# Patient Record
Sex: MALE | Race: WHITE | Employment: OTHER | URBAN - METROPOLITAN AREA
[De-identification: names, ages, dates, MRNs, and addresses within clinical notes are randomized per-mention and may not be internally consistent; named-entity substitution may affect disease eponyms.]

---

## 2017-07-20 PROBLEM — I10 ESSENTIAL HYPERTENSION: Status: ACTIVE | Noted: 2017-07-20

## 2020-02-28 PROBLEM — I73.9 CLAUDICATION (HCC): Status: ACTIVE | Noted: 2020-02-28

## 2020-02-28 PROBLEM — Z78.9 STATIN INTOLERANCE: Status: ACTIVE | Noted: 2020-02-28

## 2020-02-28 PROBLEM — I73.9 CLAUDICATION: Status: ACTIVE | Noted: 2020-02-28

## 2020-04-03 PROBLEM — E78.2 MIXED HYPERLIPIDEMIA: Status: ACTIVE | Noted: 2020-04-03

## 2020-12-17 PROBLEM — E78.2 MIXED HYPERLIPIDEMIA: Status: RESOLVED | Noted: 2020-04-03 | Resolved: 2020-12-17

## 2020-12-17 PROBLEM — I48.3 TYPICAL ATRIAL FLUTTER (HCC): Status: ACTIVE | Noted: 2020-12-17

## 2023-11-13 RX ORDER — FEBUXOSTAT 40 MG/1
40 TABLET, FILM COATED ORAL DAILY
Qty: 30 TABLET | Refills: 0 | Status: SHIPPED | OUTPATIENT
Start: 2023-11-13

## 2023-11-13 NOTE — TELEPHONE ENCOUNTER
LOV: 03/18/2021  No future appointments  LABS: Collected 1/12/2022  6:36 PM       Status: Final result    0 Result Notes      Component  Ref Range & Units 1/12/22  6:36 PM   Uric Acid  4.4 - 7.6 MG/DL 3.6 Low    Resulting Agency [facility]

## 2023-11-27 RX ORDER — FEBUXOSTAT 40 MG/1
40 TABLET, FILM COATED ORAL DAILY
Qty: 30 TABLET | Refills: 0 | OUTPATIENT
Start: 2023-11-27

## 2024-01-02 RX ORDER — FEBUXOSTAT 40 MG/1
40 TABLET, FILM COATED ORAL DAILY
Qty: 30 TABLET | Refills: 0 | OUTPATIENT
Start: 2024-01-02

## 2024-01-02 RX ORDER — PREDNISONE 1 MG/1
2 TABLET ORAL DAILY
Qty: 60 TABLET | Refills: 5 | OUTPATIENT
Start: 2024-01-02

## 2024-02-07 ENCOUNTER — OFFICE VISIT (OUTPATIENT)
Dept: RHEUMATOLOGY | Facility: CLINIC | Age: 64
End: 2024-02-07
Payer: COMMERCIAL

## 2024-02-07 VITALS
HEIGHT: 71 IN | WEIGHT: 213 LBS | BODY MASS INDEX: 29.82 KG/M2 | RESPIRATION RATE: 16 BRPM | TEMPERATURE: 98 F | OXYGEN SATURATION: 98 % | DIASTOLIC BLOOD PRESSURE: 70 MMHG | HEART RATE: 84 BPM | SYSTOLIC BLOOD PRESSURE: 120 MMHG

## 2024-02-07 DIAGNOSIS — M10.039 IDIOPATHIC GOUT OF WRIST, UNSPECIFIED CHRONICITY, UNSPECIFIED LATERALITY: ICD-10-CM

## 2024-02-07 DIAGNOSIS — M17.11 PRIMARY OSTEOARTHRITIS OF RIGHT KNEE: Primary | ICD-10-CM

## 2024-02-07 DIAGNOSIS — Z79.52 ON PREDNISONE THERAPY: ICD-10-CM

## 2024-02-07 DIAGNOSIS — M85.80 OSTEOPENIA, UNSPECIFIED LOCATION: ICD-10-CM

## 2024-02-07 PROCEDURE — 3074F SYST BP LT 130 MM HG: CPT | Performed by: INTERNAL MEDICINE

## 2024-02-07 PROCEDURE — 99215 OFFICE O/P EST HI 40 MIN: CPT | Performed by: INTERNAL MEDICINE

## 2024-02-07 PROCEDURE — 3078F DIAST BP <80 MM HG: CPT | Performed by: INTERNAL MEDICINE

## 2024-02-07 PROCEDURE — 3008F BODY MASS INDEX DOCD: CPT | Performed by: INTERNAL MEDICINE

## 2024-02-07 RX ORDER — FEBUXOSTAT 40 MG/1
40 TABLET, FILM COATED ORAL DAILY
Qty: 30 TABLET | Refills: 5 | Status: SHIPPED | OUTPATIENT
Start: 2024-02-07 | End: 2024-02-07

## 2024-02-07 RX ORDER — TRAZODONE HYDROCHLORIDE 50 MG/1
50 TABLET ORAL NIGHTLY
COMMUNITY
Start: 2022-07-01

## 2024-02-07 RX ORDER — PREDNISONE 1 MG/1
1 TABLET ORAL 2 TIMES DAILY WITH MEALS
Qty: 180 TABLET | Refills: 1 | Status: SHIPPED | OUTPATIENT
Start: 2024-02-07

## 2024-02-07 RX ORDER — FEBUXOSTAT 40 MG/1
40 TABLET, FILM COATED ORAL DAILY
Qty: 30 TABLET | Refills: 0 | Status: SHIPPED | OUTPATIENT
Start: 2024-02-07 | End: 2024-02-07

## 2024-02-07 RX ORDER — PREDNISONE 1 MG/1
1 TABLET ORAL 2 TIMES DAILY WITH MEALS
Qty: 60 TABLET | Refills: 1 | Status: SHIPPED | OUTPATIENT
Start: 2024-02-07 | End: 2024-02-07

## 2024-02-07 RX ORDER — PREDNISONE 1 MG/1
1 TABLET ORAL 2 TIMES DAILY WITH MEALS
Qty: 60 TABLET | Refills: 5 | Status: SHIPPED | OUTPATIENT
Start: 2024-02-07 | End: 2024-02-07

## 2024-02-07 RX ORDER — FEBUXOSTAT 40 MG/1
40 TABLET, FILM COATED ORAL DAILY
Qty: 90 TABLET | Refills: 1 | Status: SHIPPED | OUTPATIENT
Start: 2024-02-07

## 2024-02-07 NOTE — PATIENT INSTRUCTIONS
OSTEOARTHRITIS    Fast Facts    Though some of the joint changes are irreversible, most patients will not need joint replacement surgery.    OA symptoms (what you feel) can vary greatly among patients.    A rheumatologist can detect arthritis and prescribe the proper treatment. The goal of treatment in OA is to reduce pain and improve function.    Exercise is an important part of OA treatment, because it can decrease joint pain and improve function.    At present, there is no treatment that can reverse the damage of OA in the joints. Researchers are trying to find ways to slow or reverse this joint damage.    Osteoarthritis (also known as OA) is a common joint disease that most often affects middle-age to elderly people. It is commonly referred to as \"wear and tear\" of the joints, but we now know that OA is a disease of the entire joint, involving the cartilage, joint lining, ligaments, and bone. Although it is more common in older people, it is not really accurate to say that the joints are just \"wearing out.\" It is characterized by breakdown of the cartilage (the tissue that cushions the ends of the bones between joints), bony changes of the joints, deterioration of tendons and ligaments, and various degrees of inflammation of the joint lining (called the synovium).    This arthritis tends to occur in the hand joints, spine, hips, knees, and great toes. The lifetime risk of developing OA of the knee is about 46%, and the lifetime risk of developing OA of the hip is 25%, according to the [facility] Osteoarthritis Project, a long-term study from the [facility] and sponsored by the Centers for Disease Control and Prevention (often called the CDC) and the National Institutes of Health.    OA is a top cause of disability in older people. The goal of osteoarthritis treatment is to reduce pain and improve function. There is no cure for the disease, but some treatments attempt to slow disease  progression.         What is osteoarthritis?    OA is a frequently slowly progressive joint disease typically seen in middle-aged to elderly people. In osteoarthritis, the cartilage between the bones in the joint breaks down. This causes the affected bones to slowly get bigger. The joint cartilage often breaks down because of mechanical stress or biochemical changes within the body, causing the bone underneath to fail. OA can occur together with other types of arthritis, such as gout or rheumatoid arthritis.    OA tends to affect commonly used joints such as the hands and spine, and the weight-bearing joints such as the hips and knees. Symptoms include:    Joint pain and stiffness    Knobby swelling at the joint    Cracking or grinding noise with joint movement    Decreased function of the joint    How do you treat osteoarthritis?    There is no proven treatment yet that can reverse joint damage from OA. The goal of osteoarthritis treatment is to reduce pain and improve function of the affected joints. Most often, this is possible with a mixture of physical measures and drug therapy and, sometimes, surgery.    Physical measures: Weight loss and exercise are useful in OA. Excess weight puts stress on your knee joints and hips and low back. For every 10 pounds of weight you lose over 10 years, you can reduce the chance of developing knee OA by up to 50 percent. Exercise can improve your muscle strength, decrease joint pain and stiffness, and lower the chance of disability due to OA. Also helpful are support (\"assistive\") devices, such as orthotics or a walking cane, that help you do daily activities. Heat or cold therapy can help relieve OA symptoms for a short time.    Certain alternative treatments such as spa (hot tub), massage, and chiropractic manipulation can help relieve pain for a short time. They can be costly, though, and require repeated treatments. Also, the long-term benefits of these alternative  (sometimes called complementary or integrative) medicine treatments are unproven but are under study.    Drug therapy: Forms of drug therapy include topical, oral (by mouth) and injections (shots). You apply topical drugs directly on the skin over the affected joints. These medicines include capsaicin cream, lidocaine and diclofenac gel. Oral pain relievers such as acetaminophen are common first treatments. So are nonsteroidal anti-inflammatory drugs (often called NSAIDs), which decrease swelling and pain.    In 2010, the government (FDA) approved the use of duloxetine (Cymbalta) for chronic (long-term) musculoskeletal pain including from OA. This oral drug is not new. It also is in use for other health concerns, such as mood disorders, nerve pain and fibromyalgia.    Patients with more serious pain may need stronger medications, such as prescription narcotics.    Joint injections with corticosteroids (sometimes called cortisone shots) or with a form of lubricant called hyaluronic acid can give months of pain relief from OA. This lubricant is given in the knee, and these shots may help delay the need for a knee replacement by a few years in some patients.    Surgery: Surgical treatment becomes an option for severe cases. This includes when the joint has serious damage, or when medical treatment fails to relieve pain and you have major loss of function. Surgery may involve arthroscopy, repair of the joint done through small incisions (cuts). If the joint damage cannot be repaired, you may need a joint replacement.    Supplements: Many over-the-counter nutrition supplements have been used for osteoarthritis treatment. Most lack good research data to support their effectiveness and safety. Among the most widely used are calcium, vitamin D and omega-3 fatty acids. To ensure safety and avoid drug interactions, consult your doctor or pharmacist before using any of these supplements. This is especially true when you are  combining these supplements with prescribed

## 2024-02-07 NOTE — PROGRESS NOTES
[facility], [address] [place] [address]      Consult     Connor Stern Patient Status:  No patient class for patient encounter    1960 MRN [id number]   Location [facility], [address] [place] [address] Attending No att. 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    He was last seen in clinic May 2023    We had restarted treatment for gout with lower doses of uloric 40 mg daily with last uric acid levels being normal at 5.8    He declined weaning prednisone and stayed on 2 milligrams daily despite risk as helps him significantly with his arthritic pain stiffness and understands long-term risk    He is open to updating a DEXA scan and updating labs    States he has not been drinking alcohol he quit his job at the [employer] since they do not have a part-time option    States no major issues with his joints.    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 He had subsequent surgery and resection and treatment with antibiotics and was completely healed And also complications with atrial flutter    He had rotator cuff tendinitis and subsequent injection with improvement overall in his pain and no further issues except for occasional achiness and stiffness that his range of motion of the shoulders completely back to normal    He has not been here in over a year.    He has not had any flareups since last visit. He states he had extensive workup from vascular surgeon with no acute findings. He uses CELI hose stockings for his lower extremity edema and likely venous insufficiency that has been chronic.    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    Denies any fevers or chills.    His knee pain has been stable. His last Synvisc injection was a year ago by orthopedic surgery.    States he is not in significant pain and he is holding off another set of injections for now.    His bone density was updated in January 2020 showing osteopenia T score -1.2 of the hip and normal lumbar spine.    He is trying to take calcium and vitamin D. His fracture risk is moderate. He is not interested in bisphosphonate prophylaxis.    States no flareups of his gout. His labs were done in January 2021.    He is a knee replacement candidate but is putting it off. But now the knee pain has worsened and the hyaluronic injections and aren't as effective. He is considering knee replacement in the future. He did get arthroscopic surgery instead with some improvement    He had allergic reaction per patient allopurinol the past.    He's noticed some lower extremity edema and he does have known varicose veins.    He has a history of atrial fibrillation but he has not seen his cardiologist that we recommended to go back there because of his lower extremity edema.    States no shortness of breath or chest pain.    he admits to some weight gain since his job is now sedentary and he stands for 8 hours instead of moving around.    He admits to not exercising regularly.    He was noted to have significantly abnormal LFTs and history of fatty liver in 2013 from an ultrasound.    He states he has gained some weight. He has not seen a gastroenterologist and his primary care physician is following this.      History/Other:      Past Medical History:  Past Medical History:   Diagnosis Date    Anxiety     Arthritis     Cirrhosis of liver (HCC)     Congestive heart disease (HCC)     COPD (chronic obstructive pulmonary disease) (HCC)     Esophageal  reflux     Gout     Hypertension     IBS (irritable bowel syndrome)     PAF (paroxysmal atrial fibrillation) (HCC)         Past Surgical History:   Past Surgical History:   Procedure Laterality Date    ANGIOGRAM  06/21/2013    C     CARDIOVERSION  06/20/2013    a-fib    HERNIA SURGERY      KNEE SURGERY Right     meniscus removal    TONSILLECTOMY         Social History:  reports that he quit smoking about 26 years ago. He has never used smokeless tobacco. He reports current alcohol use. He reports that he does not use drugs.    Family History:   Family History   Family history unknown: Yes       Allergies:   Allergies   Allergen Reactions    Colchicine OTHER (SEE COMMENTS)     DIZZINESS    Ezetimibe OTHER (SEE COMMENTS)     MUSCLE PAIN    Pravastatin OTHER (SEE COMMENTS)     MUSCLE PAIN    Allopurinol RASH     Other reaction(s): Other (See Comments)  ELEVATED LIVER ENZYMES       Current Medications:  Current Outpatient Medications   Medication Sig Dispense Refill    traZODone 50 MG Oral Tab Take 1 tablet (50 mg total) by mouth nightly.      febuxostat 40 MG Oral Tab Take 1 tablet (40 mg total) by mouth daily. 30 tablet 5    predniSONE 1 MG Oral Tab Take 1 tablet (1 mg total) by mouth 2 (two) times daily with meals. 60 tablet 5    digoxin 0.125 MG Oral Tab Take 1 tablet (125 mcg total) by mouth daily. 90 tablet 2    dilTIAZem ER (CARDIZEM CD) 120 MG Oral Capsule SR 24 Hr Take 1 capsule (120 mg total) by mouth daily. 90 capsule 3    HYDROcodone-acetaminophen (NORCO)  MG Oral Tab Take 1 tablet by mouth every 6 (six) hours as needed for Pain. 40 tablet 0    LORazepam (ATIVAN) 0.5 MG Oral Tab Take 1 tablet (0.5 mg total) by mouth 2 (two) times daily.  0          (Not in a hospital admission)      Review of Systems:     Constitutional: Negative for chills, , fatigue, fever and unexpected weight change.    HENT: Negative for congestion, and mouth sores.    Eyes: Negative for photophobia, pain, redness and visual  disturbance.    Respiratory: Negative for apnea, cough, chest tightness, shortness of breath, wheezing and stridor.    Cardiovascular: Negative for chest pain, palpitations and leg swelling.    Gastrointestinal: Negative for abdominal distention, abdominal pain, blood in stool, constipation, diarrhea and nausea.    Endocrine: Negative.     Genitourinary: Negative for decreased urine volume, difficulty urinating, dyspareunia, dysuria, flank pain, and frequency.    Musculoskeletal: Occasional arthralgias, no gait problem and joint swelling.    Skin: Negative for color change, pallor and rash. No raynauds or digital ulcerations no sclerodactly.    Allergic/Immunologic: Negative.    Neurological: Negative for dizziness, tremors, seizures, syncope, speech difficulty, weakness, light-headedness, numbness and headaches.    Hematological: Does not bruise/bleed easily.    Psychiatric/Behavioral: Negative for confusion, decreased concentration, hallucinations, self-injury, sleep disturbance and suicidal ideas or depression.    Objective:   Vitals:    02/07/24 1305   BP: 120/70   Pulse: 84   Resp: 16   Temp: 98.1 °F (36.7 °C)          Constitutional: is oriented to person, place, and time. Appears well-developed and well-nourished. No distress.    HEENT: Normocephalic; EOMI; no jvd; no LAD; no oral or nasal ulcers.     Eyes: Conjunctivae and EOM are normal. Pupils are equal, round, and reactive to light.     Neck: Normal range of motion. No thyromegaly present.    Cardiovascular: RRR, no murmurs.    Lungs: Clear, Bilateral air entry, no wheezes.    Abdominal: Soft.    Musculoskeletal:    There is currently no information documented on the [facility]. Go to the Rheumatology activity and complete the [facility] joint exam.     Joint Exam 02/07/2024     No joint exam has been documented for this visit        Swollen: --     Tender: --         Right shoulder: Exhibits normal range of motion on abduction and internal rotation, no  tenderness, no bony tenderness, no deformity, no laceration, no pain and no spasm.        Left shoulder: Exhibits normal range of motion on abduction and internal and external rotation.  no tenderness, no bony tenderness, no swelling, no effusion, no deformity, no pain, no spasm and normal strength.        Right elbow:  Exhibits normal range of motion, no swelling, no effusion and no deformity. No tenderness found. No medial epicondyle, no lateral epicondyle and no olecranon process tenderness noted. There are no contractures or tophi or nodules.        Left elbow:  Normal range of motion, no swelling, no effusion and no deformity. No medial epicondyle, no lateral epicondyle and no olecranon process tenderness noted. There are no contractures or tophi or nodules.        Right wrist:  Exhibits normal range of motion, no tenderness, no bony tenderness, no swelling, no effusion and no crepitus. Flexion and extension intact w/o limitation.        Left wrist: Exhibits normal range of motion, no tenderness, no bony tenderness, no swelling, no effusion, no crepitus and no deformity. Flexion and extension intact without limitation.        Right hip: Exhibits normal range of motion, normal strength, no tenderness, no bony tenderness, no swelling and no crepitus.        Right hand: No synovitis of MCP,PIP or DIP joints; there are extensive scattered Bouchards and Heberden nodules noted;  strength: 100%.  Moderate to severe squaring first CMC joint        Left hand: No synovitis of MCP,PIP or DIP joints; there are extensive scattered Bouchards and Heberden nodules noted;  strength: 100%.  Moderate to severe squaring first CMC joint        Left hip: Exhibits normal range of motion, normal strength, no tenderness, no bony tenderness, No swelling and no crepitus.        Right knee: Exhibits normal range of motion, no swelling, no effusion, no ecchymosis, no deformity and no erythema. No tenderness found. No medial joint  line, no lateral joint line, no MCL and no LCL tenderness noted. mod crepitation on flexion of knee and extension normal.        Left knee:  Exhibits normal range of motion, no swelling, no effusion, no ecchymosis and no erythema. No tenderness found. No medial joint line, no lateral joint line and no patellar tendon tenderness noted. mod crepitation on flexion of the knee. Extension intact and normal.        Right ankle: No swelling, no deformity. No tenderness. Dorsiflexion and plantar flexion intact without limitation in range of motion.        Left ankle: Exhibits no swelling. No tenderness. No lateral malleolus and no medial malleolus tenderness found. Achilles tendon normal. Achilles tendon exhibits no pain, no defect and normal Thorne's test results.  Dorsiflexion and plantar flexion intact without limitation in range of motion.        Cervical back: Exhibits normal range of motion, no tenderness, no bony tenderness, no swelling, no pain and no spasm.        Thoracic back: Exhibits normal range of motion, no tenderness, no bony tenderness and no spasm.        Lumbar back:  Exhibits normal range of motion, no tenderness, no bony tenderness, no pain and no spasm.        Right foot: normal. There is normal range of motion, no tenderness, no bony tenderness, no crepitus and no laceration. There is no synovitis or tenderness of the MTP joints to palpation.  Significant hammertoe deformities and bony enlargement MTP joint        Left foot: normal. There is normal range of motion, no tenderness, no bony tenderness and no crepitus. There is no synovitis or tenderness of the MTP joints to palpation.  Significant hammertoe deformities and bony enlargement of the MTP joints    Lymphadenopathy: No submental, no submandibular, and no occipital adenopathy present, has no cervical adenopathy or axillary lympadenopathy.    Neurological: Alert and oriented. No focal motor or sensory abnormalities. Strength is 5/5 Upper  Extremities/Lower Extremities proximally and distally.    Skin: Skin is warm, dry and intact.  No rashes no purpuric petechial lesions    Psychiatric: Normal behavior.    Results:    Labs:      Lab Results   Component Value Date    WBC 19.0 (H) 02/10/2022     (H) 02/10/2022    MCH 35.5 (H) 02/10/2022    MCHC 34.2 02/10/2022    RDW 13.8 02/10/2022     02/10/2022       No components found for: \"RELY\", \"NMET\", \"MYEL\", \"PROMY\", \"ROBIN\", \"ABSNEUTS\", \"ABSBANDS\", \"ABMM\", \"ABMY\", \"ABPM\", \"ABBL\"      Lab Results   Component Value Date     (L) 02/10/2022    K 4.4 02/10/2022    CO2 27 02/10/2022    BUN 35 (H) 02/10/2022    ALB 2.8 (L) 02/10/2022    AST 97 (H) 02/10/2022    ALT 34 02/10/2022          No components found for: \"ESRWESTERGRN\"       Lab Results   Component Value Date    CRP 13.7 (H) 01/12/2022         No results found for: \"COLOR\", \"CLARITY\", \"UROBILINOGEN\", \"YEAST\"  @LABRCNTIP(RF,B12)@      [unfilled]    Imaging:  No results found.    Assessment & Plan:      64-year-old man comes in for followup for:    History of chronic gout Without tophi  Mild generalized osteoarthritis  Chronic prednisone therapy  Moderate to severe osteoarthritis of the knee followed by orthopedic surgery  Lower extremity edema with noted varicose veins likely venous insufficiency  Left rotator cuff tendinitis/adhesive capsulitis  History of osteopenia with moderate fracture risk    Patient has no synovitis on exam.  Continue Uloric 40 mg daily  Patient would like to remain on prednisone 2 mg daily despite long-term risks of steroids  Recent uric acid levels normal    History of alcohol abuse now does not drink alcohol in 3 years. He is attending regular meetings to continue avoiding going backwards    Continue prednisone to 2 mg daily until labs are back. He is not interested in weaning down further.    Long-term risk of steroids discussed    Given orders to update labs at this time and building a.  Also given orders to  update DEXA scan    Suspect that he has venous insufficiency with edema. This is followed by PCP and worked up through cardiovascular surgeon with no acute findings. He uses CELI hose stockings periodically.    I discussed possibility of liver cirrhosis from fatty liver that can be irreversible.    Also discussed importance of avoiding alcohol because of alcohol abuse and was recently found out last year    Patient is not interested in narcotics.    Bone health: DEXA scan shows osteopenia T score -1.2 of the hip and lumbar spine normal continue calcium and vitamin D. Not interested in prophylactic bisphosphonate therapy.  In 2020.  New DEXA scan order given to patient today    Generalized osteoarthritis of the knees: His knee pain is minimal this time. He is a candidate for knee replacement but is trying to hold off. He is followed by orthopedic surgery. He is failed steroid injections and gets hyaluronic injections every 6 months. His last one was one year ago. He's had multiple surgeries to his right knee in the past. Is considering knee replacement surgery    Significantly abnormal LFTs. Has ultrasound diagnosis of hepatic steatosis in 2013. Suggest he gets referred to GI through his primary care physician. Defer further management to them. Suggested weight loss and exercise.    Recent diagnosis of cuff tendinitis adhesive capsulitis. status post shoulder injection with overall improvement and no further issues with range of motion      Education and counseling provided to patient.  Instructed patient to call my office or seek medical attention immediately if symptoms worsen. Risks and side effects of medications and diagnosis discussed in detail and patient was given written information on new prescribed medications.    Return to clinic:  Return in about 6 months (around 8/7/2024).    Frances Friedman MD  2/7/2024       [Follow-Up] : a follow-up visit  [FreeTextEntry3] : [facility]  [FreeTextEntry5] : 7/2/20

## 2024-08-07 ENCOUNTER — OFFICE VISIT (OUTPATIENT)
Dept: RHEUMATOLOGY | Facility: CLINIC | Age: 64
End: 2024-08-07
Payer: MEDICARE

## 2024-08-07 VITALS
HEART RATE: 95 BPM | WEIGHT: 230 LBS | RESPIRATION RATE: 16 BRPM | OXYGEN SATURATION: 97 % | BODY MASS INDEX: 32.2 KG/M2 | TEMPERATURE: 98 F | DIASTOLIC BLOOD PRESSURE: 72 MMHG | HEIGHT: 71 IN | SYSTOLIC BLOOD PRESSURE: 120 MMHG

## 2024-08-07 DIAGNOSIS — M85.80 OSTEOPENIA, UNSPECIFIED LOCATION: ICD-10-CM

## 2024-08-07 DIAGNOSIS — I87.2 VENOUS INSUFFICIENCY OF BOTH LOWER EXTREMITIES: ICD-10-CM

## 2024-08-07 DIAGNOSIS — I73.9 CLAUDICATION (HCC): ICD-10-CM

## 2024-08-07 DIAGNOSIS — I48.0 AF (PAROXYSMAL ATRIAL FIBRILLATION) (HCC): Primary | ICD-10-CM

## 2024-08-07 DIAGNOSIS — K70.30 ALCOHOLIC CIRRHOSIS OF LIVER WITHOUT ASCITES (HCC): ICD-10-CM

## 2024-08-07 DIAGNOSIS — Z78.9 STATIN INTOLERANCE: ICD-10-CM

## 2024-08-07 DIAGNOSIS — Z79.899 ENCOUNTER FOR DRUG THERAPY: ICD-10-CM

## 2024-08-07 DIAGNOSIS — Z79.52 ON PREDNISONE THERAPY: ICD-10-CM

## 2024-08-07 DIAGNOSIS — M1A.00X0 GOUTY ARTHROPATHY, CHRONIC, WITHOUT TOPHI: ICD-10-CM

## 2024-08-07 DIAGNOSIS — E55.9 VITAMIN D DEFICIENCY, UNSPECIFIED: ICD-10-CM

## 2024-08-07 PROBLEM — L97.522 TOE ULCER, LEFT, WITH FAT LAYER EXPOSED (HCC): Status: ACTIVE | Noted: 2024-08-07

## 2024-08-07 PROBLEM — M00.9 PYOGENIC ARTHRITIS OF RIGHT KNEE JOINT, DUE TO UNSPECIFIED ORGANISM (HCC): Status: ACTIVE | Noted: 2024-08-07

## 2024-08-07 PROCEDURE — 99214 OFFICE O/P EST MOD 30 MIN: CPT | Performed by: INTERNAL MEDICINE

## 2024-08-07 RX ORDER — ASPIRIN 325 MG
325 TABLET ORAL DAILY
COMMUNITY

## 2024-08-07 NOTE — PROGRESS NOTES
[facility], [address] [place] [address]      Consult     Connor Stern Patient Status:  No patient class for patient encounter    1960 MRN [id number]   Location [facility], [address] [place] [address] Attending No att. providers found   Hosp Day # 0 PCP No primary care provider on file.     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    He was last seen in clinic 2024    We had restarted treatment for gout with lower doses of uloric 40 mg daily with last uric acid levels being normal at 5.8    He has weaned prednisone to 1 mg daily without worsening symptoms    States he did update DEXA scan which we have not received from [facility]    States he has not been drinking alcohol he quit his job at the [employer] since they do not have a part-time option    States no major issues with his joints.    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 He had subsequent surgery and resection and treatment with antibiotics and was completely healed And also complications with atrial flutter    He had rotator cuff tendinitis and subsequent injection with improvement overall in his pain and no further issues except for occasional achiness and stiffness that his range of motion of the shoulders completely back to normal    He has not been here in over a year.    He has not had any flareups since last visit. He states he had extensive workup from vascular surgeon with no acute findings. He uses CELI hose stockings for his lower extremity edema and likely venous insufficiency that has been chronic.    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    Denies any fevers or chills.    His knee pain has been stable. His last Synvisc injection was a year ago by orthopedic surgery.    States he is not in significant pain and he is holding off another set of injections for now.    His bone density was updated in January 2020 showing osteopenia T score -1.2 of the hip and normal lumbar spine.    He is trying to take calcium and vitamin D. His fracture risk is moderate. He is not interested in bisphosphonate prophylaxis.    States no flareups of his gout. His labs were done in January 2021.    He is a knee replacement candidate but is putting it off. But now the knee pain has worsened and the hyaluronic injections and aren't as effective. He is considering knee replacement in the future. He did get arthroscopic surgery instead with some improvement    He had allergic reaction per patient allopurinol the past.    He's noticed some lower extremity edema and he does have known varicose veins.    He has a history of atrial fibrillation but he has not seen his cardiologist that we recommended to go back there because of his lower extremity edema.    States no shortness of breath or chest pain.    he admits to some weight gain since his job is now sedentary and he stands for 8 hours instead of moving around.    He admits to not exercising regularly.    He was noted to have significantly abnormal LFTs and history of fatty liver in 2013 from an ultrasound.    He states he has gained some weight. He has not seen a gastroenterologist and his primary care physician is following this.      History/Other:      Past Medical History:  Past Medical History:    Anxiety    Arthritis    Cirrhosis of liver (HCC)    Congestive heart disease (HCC)    COPD (chronic obstructive pulmonary disease) (HCC)    Esophageal reflux    Gout    Hypertension     IBS (irritable bowel syndrome)    PAF (paroxysmal atrial fibrillation) (HCC)        Past Surgical History:   Past Surgical History:   Procedure Laterality Date    Angiogram  06/21/2013    C     Cardioversion  06/20/2013    a-fib    Hernia surgery      Knee surgery Right     meniscus removal    Tonsillectomy         Social History:  reports that he quit smoking about 26 years ago. He has never used smokeless tobacco. He reports current alcohol use. He reports that he does not use drugs.    Family History:   Family History   Family history unknown: Yes       Allergies:   Allergies   Allergen Reactions    Colchicine OTHER (SEE COMMENTS)     DIZZINESS    Ezetimibe OTHER (SEE COMMENTS)     MUSCLE PAIN    Pravastatin OTHER (SEE COMMENTS)     MUSCLE PAIN    Allopurinol RASH     Other reaction(s): Other (See Comments)  ELEVATED LIVER ENZYMES       Current Medications:  Current Outpatient Medications   Medication Sig Dispense Refill    aspirin 325 MG Oral Tab Take 1 tablet (325 mg total) by mouth daily.      traZODone 50 MG Oral Tab Take 1 tablet (50 mg total) by mouth nightly.      predniSONE 1 MG Oral Tab Take 1 tablet (1 mg total) by mouth 2 (two) times daily with meals. 180 tablet 1    febuxostat 40 MG Oral Tab Take 1 tablet (40 mg total) by mouth daily. 90 tablet 1    digoxin 0.125 MG Oral Tab Take 1 tablet (125 mcg total) by mouth daily. 90 tablet 2    dilTIAZem ER (CARDIZEM CD) 120 MG Oral Capsule SR 24 Hr Take 1 capsule (120 mg total) by mouth daily. 90 capsule 3    HYDROcodone-acetaminophen (NORCO)  MG Oral Tab Take 1 tablet by mouth every 6 (six) hours as needed for Pain. 40 tablet 0    LORazepam (ATIVAN) 0.5 MG Oral Tab Take 1 tablet (0.5 mg total) by mouth 2 (two) times daily.  0      No current facility-administered medications for this visit.       (Not in a hospital admission)      Review of Systems:     Constitutional: Negative for chills, , fatigue, fever and unexpected weight change.    HENT:  Negative for congestion, and mouth sores.    Eyes: Negative for photophobia, pain, redness and visual disturbance.    Respiratory: Negative for apnea, cough, chest tightness, shortness of breath, wheezing and stridor.    Cardiovascular: Negative for chest pain, palpitations and leg swelling.    Gastrointestinal: Negative for abdominal distention, abdominal pain, blood in stool, constipation, diarrhea and nausea.    Endocrine: Negative.     Genitourinary: Negative for decreased urine volume, difficulty urinating, dyspareunia, dysuria, flank pain, and frequency.    Musculoskeletal: Occasional arthralgias, no gait problem and joint swelling.    Skin: Negative for color change, pallor and rash. No raynauds or digital ulcerations no sclerodactly.    Allergic/Immunologic: Negative.    Neurological: Negative for dizziness, tremors, seizures, syncope, speech difficulty, weakness, light-headedness, numbness and headaches.    Hematological: Does not bruise/bleed easily.    Psychiatric/Behavioral: Negative for confusion, decreased concentration, hallucinations, self-injury, sleep disturbance and suicidal ideas or depression.    Objective:   Vitals:    08/07/24 1045   BP: 120/72   Pulse: 95   Resp: 16   Temp: 98.3 °F (36.8 °C)          Constitutional: is oriented to person, place, and time. Appears well-developed and well-nourished. No distress.    HEENT: Normocephalic; EOMI; no jvd; no LAD; no oral or nasal ulcers.     Eyes: Conjunctivae and EOM are normal. Pupils are equal, round, and reactive to light.     Neck: Normal range of motion. No thyromegaly present.    Cardiovascular: RRR, no murmurs.    Lungs: Clear, Bilateral air entry, no wheezes.    Abdominal: Soft.    Musculoskeletal:    There is currently no information documented on the homunculus. Go to the Rheumatology activity and complete the homunculus joint exam.     Joint Exam 08/07/2024     No joint exam has been documented for this visit        Swollen: --      Tender: --         Right shoulder: Exhibits normal range of motion on abduction and internal rotation, no tenderness, no bony tenderness, no deformity, no laceration, no pain and no spasm.        Left shoulder: Exhibits normal range of motion on abduction and internal and external rotation.  no tenderness, no bony tenderness, no swelling, no effusion, no deformity, no pain, no spasm and normal strength.        Right elbow:  Exhibits normal range of motion, no swelling, no effusion and no deformity. No tenderness found. No medial epicondyle, no lateral epicondyle and no olecranon process tenderness noted. There are no contractures or tophi or nodules.        Left elbow:  Normal range of motion, no swelling, no effusion and no deformity. No medial epicondyle, no lateral epicondyle and no olecranon process tenderness noted. There are no contractures or tophi or nodules.        Right wrist:  Exhibits normal range of motion, no tenderness, no bony tenderness, no swelling, no effusion and no crepitus. Flexion and extension intact w/o limitation.        Left wrist: Exhibits normal range of motion, no tenderness, no bony tenderness, no swelling, no effusion, no crepitus and no deformity. Flexion and extension intact without limitation.        Right hip: Exhibits normal range of motion, normal strength, no tenderness, no bony tenderness, no swelling and no crepitus.        Right hand: No synovitis of MCP,PIP or DIP joints; there are extensive scattered Bouchards and Heberden nodules noted;  strength: 100%.  Moderate to severe squaring first CMC joint        Left hand: No synovitis of MCP,PIP or DIP joints; there are extensive scattered Bouchards and Heberden nodules noted;  strength: 100%.  Moderate to severe squaring first CMC joint        Left hip: Exhibits normal range of motion, normal strength, no tenderness, no bony tenderness, No swelling and no crepitus.        Right knee: Exhibits normal range of motion,  no swelling, no effusion, no ecchymosis, no deformity and no erythema. No tenderness found. No medial joint line, no lateral joint line, no MCL and no LCL tenderness noted. mod crepitation on flexion of knee and extension normal.        Left knee:  Exhibits normal range of motion, no swelling, no effusion, no ecchymosis and no erythema. No tenderness found. No medial joint line, no lateral joint line and no patellar tendon tenderness noted. mod crepitation on flexion of the knee. Extension intact and normal.        Right ankle: No swelling, no deformity. No tenderness. Dorsiflexion and plantar flexion intact without limitation in range of motion.        Left ankle: Exhibits no swelling. No tenderness. No lateral malleolus and no medial malleolus tenderness found. Achilles tendon normal. Achilles tendon exhibits no pain, no defect and normal Thorne's test results.  Dorsiflexion and plantar flexion intact without limitation in range of motion.        Cervical back: Exhibits normal range of motion, no tenderness, no bony tenderness, no swelling, no pain and no spasm.        Thoracic back: Exhibits normal range of motion, no tenderness, no bony tenderness and no spasm.        Lumbar back:  Exhibits normal range of motion, no tenderness, no bony tenderness, no pain and no spasm.        Right foot: normal. There is normal range of motion, no tenderness, no bony tenderness, no crepitus and no laceration. There is no synovitis or tenderness of the MTP joints to palpation.  Significant hammertoe deformities and bony enlargement MTP joint        Left foot: normal. There is normal range of motion, no tenderness, no bony tenderness and no crepitus. There is no synovitis or tenderness of the MTP joints to palpation.  Significant hammertoe deformities and bony enlargement of the MTP joints    Lymphadenopathy: No submental, no submandibular, and no occipital adenopathy present, has no cervical adenopathy or axillary  lympadenopathy.    Neurological: Alert and oriented. No focal motor or sensory abnormalities. Strength is 5/5 Upper Extremities/Lower Extremities proximally and distally.    Skin: Skin is warm, dry and intact.  No rashes no purpuric petechial lesions    Psychiatric: Normal behavior.    Results:    Labs:      Lab Results   Component Value Date    WBC 19.0 (H) 02/10/2022     (H) 02/10/2022    MCH 35.5 (H) 02/10/2022    MCHC 34.2 02/10/2022    RDW 13.8 02/10/2022     02/10/2022       No components found for: \"RELY\", \"NMET\", \"MYEL\", \"PROMY\", \"ROBIN\", \"ABSNEUTS\", \"ABSBANDS\", \"ABMM\", \"ABMY\", \"ABPM\", \"ABBL\"      Lab Results   Component Value Date     (L) 02/10/2022    K 4.4 02/10/2022    CO2 27 02/10/2022    BUN 35 (H) 02/10/2022    ALB 2.8 (L) 02/10/2022    AST 97 (H) 02/10/2022    ALT 34 02/10/2022          No components found for: \"ESRWESTERGRN\"       Lab Results   Component Value Date    CRP 13.7 (H) 01/12/2022         No results found for: \"COLOR\", \"CLARITY\", \"UROBILINOGEN\", \"YEAST\"  @LABRCNTIP(RF,B12)@      [unfilled]    Imaging:  No results found.    Assessment & Plan:      64-year-old man comes in for followup for:    History of chronic gout Without tophi  Mild generalized osteoarthritis  Chronic prednisone therapy  Moderate to severe osteoarthritis of the knee followed by orthopedic surgery  Lower extremity edema with noted varicose veins likely venous insufficiency  Left rotator cuff tendinitis/adhesive capsulitis  History of osteopenia with moderate fracture risk    Patient has no synovitis on exam.  Continue Uloric 40 mg daily  Patient would like to remain on prednisone 2 mg daily despite long-term risks of steroids  Recent uric acid levels normal    History of alcohol abuse now does not drink alcohol in 3 years. He is attending regular meetings to continue avoiding going backwards    Continue prednisone to 1 mg daily . He is not interested in weaning down further.    Long-term risk of  steroids discussed    Patient had labs through PCP with recent CBC CMP normal no recent uric acid level have given him new orders for next 6 months.  He is okay to change visits to once a year    Will try to obtain the results of the bone density that was done    Suspect that he has venous insufficiency with edema. This is followed by PCP and worked up through cardiovascular surgeon with no acute findings. He uses CELI hose stockings periodically.    I discussed possibility of liver cirrhosis from fatty liver that can be irreversible.    Also discussed importance of avoiding alcohol because of alcohol abuse and was recently found out last year    Patient is not interested in narcotics.    Bone health: DEXA scan shows osteopenia T score -1.2 of the hip and lumbar spine normal continue calcium and vitamin D. Not interested in prophylactic bisphosphonate therapy.  In 2020.  Will obtain the new DEXA report    Generalized osteoarthritis of the knees: His knee pain is minimal this time. He is a candidate for knee replacement but is trying to hold off. He is followed by orthopedic surgery. He is failed steroid injections and gets hyaluronic injections every 6 months. His last one was one year ago. He's had multiple surgeries to his right knee in the past. Is considering knee replacement surgery but he would like to hold off for now    Significantly abnormal LFTs. Has ultrasound diagnosis of hepatic steatosis in 2013. Suggest he gets referred to GI through his primary care physician. Defer further management to them. Suggested weight loss and exercise.    Recent diagnosis of cuff tendinitis adhesive capsulitis. status post shoulder injection with overall improvement and no further issues with range of motion      Education and counseling provided to patient.  Instructed patient to call my office or seek medical attention immediately if symptoms worsen. Risks and side effects of medications and diagnosis discussed in detail  and patient was given written information on new prescribed medications.    Return to clinic:  Return in about 1 year (around 8/7/2025).    Frances Friedman MD  2/7/2024

## 2024-11-10 DIAGNOSIS — M10.039 IDIOPATHIC GOUT OF WRIST, UNSPECIFIED CHRONICITY, UNSPECIFIED LATERALITY: ICD-10-CM

## 2024-11-10 DIAGNOSIS — M17.11 PRIMARY OSTEOARTHRITIS OF RIGHT KNEE: ICD-10-CM

## 2024-11-11 RX ORDER — PREDNISONE 1 MG/1
1 TABLET ORAL 2 TIMES DAILY WITH MEALS
Qty: 180 TABLET | Refills: 1 | Status: SHIPPED | OUTPATIENT
Start: 2024-11-11

## 2024-11-11 RX ORDER — FEBUXOSTAT 40 MG/1
40 TABLET, FILM COATED ORAL DAILY
Qty: 90 TABLET | Refills: 1 | Status: SHIPPED | OUTPATIENT
Start: 2024-11-11

## 2024-11-11 NOTE — TELEPHONE ENCOUNTER
LOV: 08/07/2024    Future Appointments   Date Time Provider Department Center   8/4/2025  9:40 AM Frances Friedman MD [facility] [address]       LF: Prednisone  02/07/2024    QTY: 180    Refills:   1           Febuxostat  02/07/2024    QTY: 90    Refills:   1    LABS: Scanned under the lab tab on 07/20/2024 and DRAWN on 07/10/2024

## 2024-11-14 DIAGNOSIS — M17.11 PRIMARY OSTEOARTHRITIS OF RIGHT KNEE: ICD-10-CM

## 2024-11-14 DIAGNOSIS — M10.039 IDIOPATHIC GOUT OF WRIST, UNSPECIFIED CHRONICITY, UNSPECIFIED LATERALITY: ICD-10-CM

## 2024-11-14 NOTE — TELEPHONE ENCOUNTER
Both medications were sent to home delivery pharmacy on 11/2024    Sending my chart message that we received a refill request for the local pharmacy and it was sent to mail order.

## 2024-11-21 RX ORDER — PREDNISONE 1 MG/1
1 TABLET ORAL 2 TIMES DAILY WITH MEALS
Qty: 180 TABLET | Refills: 1 | OUTPATIENT
Start: 2024-11-21

## 2024-11-21 RX ORDER — FEBUXOSTAT 40 MG/1
40 TABLET, FILM COATED ORAL DAILY
Qty: 90 TABLET | Refills: 1 | OUTPATIENT
Start: 2024-11-21

## 2025-06-10 ENCOUNTER — TELEPHONE (OUTPATIENT)
Facility: CLINIC | Age: 65
End: 2025-06-10

## 2025-06-10 DIAGNOSIS — M1A.00X0 GOUTY ARTHROPATHY, CHRONIC, WITHOUT TOPHI: Primary | ICD-10-CM

## 2025-06-10 RX ORDER — FEBUXOSTAT 40 MG/1
40 TABLET, FILM COATED ORAL DAILY
Qty: 30 TABLET | Refills: 0 | Status: SHIPPED | OUTPATIENT
Start: 2025-06-10

## 2025-06-10 RX ORDER — PREDNISONE 1 MG/1
1 TABLET ORAL 2 TIMES DAILY WITH MEALS
Qty: 60 TABLET | Refills: 0 | Status: SHIPPED | OUTPATIENT
Start: 2025-06-10

## 2025-06-10 NOTE — TELEPHONE ENCOUNTER
LOV: 8/7/2024    RTC: 1 year   Future Appointments   Date Time Provider Department Center   8/4/2025  9:40 AM Frances Friedman MD [facility] [address]   LABS: not completed at this time.     LAST REFILL:   PREDNISONE: 11/11/2024, Quantity 180, Refills 1  FEBUXOSTAT: 11/11/2024, Quantity 90, Refills 1    On call provider: one month pending, approve if agreeable.

## 2025-06-19 DIAGNOSIS — M1A.00X0 GOUTY ARTHROPATHY, CHRONIC, WITHOUT TOPHI: ICD-10-CM

## 2025-06-19 RX ORDER — FEBUXOSTAT 40 MG/1
40 TABLET, FILM COATED ORAL DAILY
Qty: 90 TABLET | Refills: 1 | OUTPATIENT
Start: 2025-06-19

## 2025-06-23 RX ORDER — FEBUXOSTAT 40 MG/1
40 TABLET, FILM COATED ORAL DAILY
Qty: 30 TABLET | Refills: 0 | OUTPATIENT
Start: 2025-06-23

## 2025-07-16 RX ORDER — PREDNISONE 1 MG/1
1 TABLET ORAL
Qty: 60 TABLET | Refills: 1 | Status: SHIPPED | OUTPATIENT
Start: 2025-07-16

## 2025-07-16 NOTE — TELEPHONE ENCOUNTER
Last office visit: 8/7/2024    Next Rheum Apt:Visit date not found    Last fill: 6/10/2025, Quantity 60, Refills 0     Labs:   Lab Results   Component Value Date    CREATSERUM 1.05 11/01/2019    ALKPHO 558 (H) 02/10/2022    AST 97 (H) 02/10/2022    ALT 34 02/10/2022    BILT 14.1 (H) 02/10/2022    TP 6.5 02/10/2022    ALB 2.8 (L) 02/10/2022       Lab Results   Component Value Date    WBC 19.0 (H) 02/10/2022     02/10/2022    NEPERCENT 83 02/10/2022    LYPERCENT 5 02/10/2022    NE 15.8 (H) 02/10/2022

## (undated) NOTE — LETTER
February 7, 2024         Ryan Osborne MD  [address] [facility] [address]  [facility] [address]      Patient: Connor Stern   YOB: 1960   Date of Visit: 2/7/2024       Dear Dr. Osborne,    I saw your patient, Connor Stern, on 2/7/2024. Enclosed is my consultation / progress note from that encounter. Thank you for allowing me to participate in the care of this patient.    Sincerely,                           Frances Friedman MD  [address], [address] [place] [phone]    Document electronically generated by:  Frances Friedman MD on 2/7/2024    CC: No Recipients    Enclosure